# Patient Record
Sex: MALE | Race: AMERICAN INDIAN OR ALASKA NATIVE
[De-identification: names, ages, dates, MRNs, and addresses within clinical notes are randomized per-mention and may not be internally consistent; named-entity substitution may affect disease eponyms.]

---

## 2017-04-13 ENCOUNTER — HOSPITAL ENCOUNTER (EMERGENCY)
Dept: HOSPITAL 5 - ED | Age: 57
Discharge: HOME | End: 2017-04-13
Payer: SELF-PAY

## 2017-04-13 VITALS — DIASTOLIC BLOOD PRESSURE: 78 MMHG | SYSTOLIC BLOOD PRESSURE: 121 MMHG

## 2017-04-13 DIAGNOSIS — J20.9: Primary | ICD-10-CM

## 2017-04-13 DIAGNOSIS — J45.909: ICD-10-CM

## 2017-04-13 PROCEDURE — 71020: CPT

## 2017-04-13 PROCEDURE — 93010 ELECTROCARDIOGRAM REPORT: CPT

## 2017-04-13 PROCEDURE — 94640 AIRWAY INHALATION TREATMENT: CPT

## 2017-04-13 PROCEDURE — 93005 ELECTROCARDIOGRAM TRACING: CPT

## 2017-04-13 NOTE — XRAY REPORT
Chest 2 views: Compared to 4/22/16.



History: Chest tightness and cough.



Findings:



Normal cardiomediastinal silhouette. Trachea is midline. No 

consolidation, pneumothorax or pleural effusion.



Impression:



No acute cardiopulmonary findings.

## 2017-04-13 NOTE — EMERGENCY DEPARTMENT REPORT
- General


Chief Complaint: Upper Respiratory Infection


Stated Complaint: THIGHT CHEST/RACHEL


Time Seen by Provider: 04/13/17 09:39


Source: patient


Mode of arrival: Ambulatory


Limitations: No Limitations





- History of Present Illness


Initial Comments: 





56-year-old -American male comes in with complaint of tight chest since 

he was inspiration and wheezing with productive cough 1 week.  Patient reports 

that he was still about 1 year ago was diagnosed as bronchitis.  He reports 

that his cough shortness of breathing improved with albuterol inhaler.  Patient 

admits to nasal congestion runny eyes and sneezing.  He does admit that since 

he has moved here from [facility] that his allergies have started to flareup.  He 

reports that he's never had this issue before.  Patient denies any fever but 

reports that he feels cold at this time.  He denies any sore throat no past 

medical history currently takes no medication.


MD Complaint: rhinorrhea, nasal congestion


-: week(s) (1)


Severity: mild


Severity scale (0 -10): 8


Improves With: other (albuterol)


Worsens With: deep breaths


Associated Symptoms: cough, shortness of breath


Treatments Prior to Arrival: none





- Related Data


 Previous Rx's











 Medication  Instructions  Recorded  Last Taken  Type


 


predniSONE [Deltasone] 50 mg PO QDAY #5 tab 04/22/16 Unknown Rx


 


Albuterol Sulfate [Ventolin HFA] 2 puff IH Q4H PRN #1 hfa.aer.ad 04/13/17 

Unknown Rx


 


Azithromycin [Zithromax Z-EUN] 250 mg PO DAILY #6 tablet 04/13/17 Unknown Rx


 


Fluticasone [Flonase] 1 spray NS BID #1 bottle 04/13/17 Unknown Rx


 


predniSONE [Deltasone] 20 mg PO QDAY #5 tab 04/13/17 Unknown Rx











 Allergies











Allergy/AdvReac Type Severity Reaction Status Date / Time


 


No Known Allergies Allergy   Verified 04/22/16 16:37














ED Review of Systems


ROS: 


Stated complaint: THIGHT CHEST/RACHEL


Other details as noted in HPI








ED Past Medical Hx





- Past Medical History


Previous Medical History?: Yes


Hx Congestive Heart Failure: No


Hx Diabetes: No


Hx Asthma: Yes (bronchitis)


Hx COPD: No





- Surgical History


Past Surgical History?: No





- Social History


Smoking Status: Never Smoker


Substance Use Type: None





- Medications


Home Medications: 


 Home Medications











 Medication  Instructions  Recorded  Confirmed  Last Taken  Type


 


predniSONE [Deltasone] 50 mg PO QDAY #5 tab 04/22/16  Unknown Rx


 


Albuterol Sulfate [Ventolin HFA] 2 puff IH Q4H PRN #1 hfa.aer.ad 04/13/17  

Unknown Rx


 


Azithromycin [Zithromax Z-EUN] 250 mg PO DAILY #6 tablet 04/13/17  Unknown Rx


 


Fluticasone [Flonase] 1 spray NS BID #1 bottle 04/13/17  Unknown Rx


 


predniSONE [Deltasone] 20 mg PO QDAY #5 tab 04/13/17  Unknown Rx














ED Physical Exam





- General


Limitations: No Limitations


General appearance: alert, in no apparent distress





- Head


Head exam: Present: atraumatic, normocephalic





- ENT


ENT exam: Present: normal exam, mucous membranes moist





- Neck


Neck exam: Present: normal inspection





- Respiratory


Respiratory exam: Present: decreased breath sounds





- Cardiovascular


Cardiovascular Exam: Present: regular rate, normal rhythm, normal heart sounds





- Neurological Exam


Neurological exam: Present: alert, oriented X3, normal gait





- Psychiatric


Psychiatric exam: Present: normal affect, normal mood





ED Course


 Vital Signs











  04/13/17 04/13/17 04/13/17





  09:23 12:13 12:15


 


Temperature 98.9 F  


 


Pulse Rate 82  


 


Pulse Rate [  74 67





Anterior]   


 


Pulse Rate [  74 68





Posterior]   


 


Respiratory 16  





Rate   


 


Respiratory  18 18





Rate [Anterior]   


 


Respiratory  18 18





Rate [Posterior   





]   


 


Blood Pressure 129/82  











Critical care attestation.: 


If time is entered above; I have spent that time in minutes in the direct care 

of this critically ill patient, excluding procedure time.








ED Disposition


Clinical Impression: 


Acute bronchitis


Qualifiers:


 Bronchitis organism: unspecified organism Qualified Code(s): J20.9 - Acute 

bronchitis, unspecified





Disposition: DISCHARGED TO HOME OR SELFCARE


Is pt being admited?: No


Does the pt Need Aspirin: No


Condition: Stable


Instructions:  Acute Bronchitis (ED)


Additional Instructions: 


Take medications as prescribed.  Follow-up with a primary care provider in 3-5 

days if symptoms does not improve or gets worse.  He may return to the 

emergency room if signs or symptoms get worse as well.


Prescriptions: 


Albuterol Sulfate [Ventolin HFA] 2 puff IH Q4H PRN #1 hfa.aer.ad


 PRN Reason: Shortness Of Breath


Azithromycin [Zithromax Z-EUN] 250 mg PO DAILY #6 tablet


Fluticasone [Flonase] 1 spray NS BID #1 bottle


predniSONE [Deltasone] 20 mg PO QDAY #5 tab


Referrals: 


PRIMARY CARE,MD [Primary Care Provider] - 3-5 Days


NOAH ACEVEDO MD [Staff Physician] - 3-5 Days


Forms:  Work/School Release Form(ED)

## 2018-03-19 ENCOUNTER — HOSPITAL ENCOUNTER (EMERGENCY)
Dept: HOSPITAL 5 - ED | Age: 58
Discharge: LEFT BEFORE DISCHARGE | End: 2018-03-19
Payer: SELF-PAY

## 2018-03-19 DIAGNOSIS — Z53.21: ICD-10-CM

## 2018-03-19 DIAGNOSIS — R06.00: Primary | ICD-10-CM

## 2024-07-23 ENCOUNTER — APPOINTMENT (RX ONLY)
Dept: URBAN - METROPOLITAN AREA CLINIC 33 | Facility: CLINIC | Age: 64
Setting detail: DERMATOLOGY
End: 2024-07-23

## 2024-07-23 DIAGNOSIS — L80 VITILIGO: ICD-10-CM

## 2024-07-23 PROCEDURE — ? PRESCRIPTION MEDICATION MANAGEMENT

## 2024-07-23 PROCEDURE — ? PRESCRIPTION

## 2024-07-23 PROCEDURE — ? COUNSELING

## 2024-07-23 PROCEDURE — 99203 OFFICE O/P NEW LOW 30 MIN: CPT

## 2024-07-23 RX ORDER — TACROLIMUS 1 MG/G
OINTMENT TOPICAL BID
Qty: 60 | Refills: 2 | Status: ERX | COMMUNITY
Start: 2024-07-23

## 2024-07-23 RX ORDER — TRIAMCINOLONE ACETONIDE 1 MG/G
OINTMENT TOPICAL
Qty: 80 | Refills: 1 | Status: ERX | COMMUNITY
Start: 2024-07-23

## 2024-07-23 RX ADMIN — TACROLIMUS: 1 OINTMENT TOPICAL at 00:00

## 2024-07-23 RX ADMIN — TRIAMCINOLONE ACETONIDE: 1 OINTMENT TOPICAL at 00:00

## 2024-07-23 ASSESSMENT — LOCATION SIMPLE DESCRIPTION DERM
LOCATION SIMPLE: RIGHT FOREARM
LOCATION SIMPLE: CHEST
LOCATION SIMPLE: RIGHT UPPER BACK
LOCATION SIMPLE: ABDOMEN
LOCATION SIMPLE: RIGHT UPPER ARM
LOCATION SIMPLE: LEFT ELBOW

## 2024-07-23 ASSESSMENT — LOCATION ZONE DERM
LOCATION ZONE: ARM
LOCATION ZONE: TRUNK

## 2024-07-23 ASSESSMENT — LOCATION DETAILED DESCRIPTION DERM
LOCATION DETAILED: RIGHT SUPERIOR UPPER BACK
LOCATION DETAILED: RIGHT MEDIAL INFERIOR CHEST
LOCATION DETAILED: LEFT ELBOW
LOCATION DETAILED: XIPHOID
LOCATION DETAILED: RIGHT ANTERIOR PROXIMAL UPPER ARM
LOCATION DETAILED: RIGHT PROXIMAL DORSAL FOREARM

## 2024-07-23 ASSESSMENT — BSA VITILIGO: % BODY COVERED IN VITILIGO: 6

## 2024-07-23 NOTE — HPI: DISCOLORATION (VITILIGO)
How Severe Is It?: mild
Is This A New Presentation, Or A Follow-Up?: Discoloration
Additional History: Patient has been taking Vitamin C, Folic Acid and B12, and he feels like it has been helping. Patient got VITIX from [place], but it was not helpful.\\nPatient states problem started back in the early 1990's.

## 2024-07-23 NOTE — PROCEDURE: PRESCRIPTION MEDICATION MANAGEMENT
Render In Strict Bullet Format?: No
Plan: Problem active for approximately 30 years. Discussed challenging to treat.\\nWould consider Opzelura at follow up if inadequate response to treatment outlined today.
Detail Level: Simple
Initiate Treatment: Alternate Triamcinolone with Tacrolimus ointment daily